# Patient Record
Sex: FEMALE | Race: WHITE | NOT HISPANIC OR LATINO | Employment: OTHER | URBAN - METROPOLITAN AREA
[De-identification: names, ages, dates, MRNs, and addresses within clinical notes are randomized per-mention and may not be internally consistent; named-entity substitution may affect disease eponyms.]

---

## 2021-01-08 ENCOUNTER — NEW PATIENT (OUTPATIENT)
Dept: URBAN - METROPOLITAN AREA CLINIC 36 | Facility: CLINIC | Age: 81
End: 2021-01-08

## 2021-01-08 DIAGNOSIS — H40.1132: ICD-10-CM

## 2021-01-08 PROCEDURE — 92250 FUNDUS PHOTOGRAPHY W/I&R: CPT

## 2021-01-08 PROCEDURE — 99204 OFFICE O/P NEW MOD 45 MIN: CPT

## 2021-01-08 PROCEDURE — 76514 ECHO EXAM OF EYE THICKNESS: CPT

## 2021-01-08 PROCEDURE — 92020 GONIOSCOPY: CPT

## 2021-01-08 ASSESSMENT — VISUAL ACUITY
OS_CC: 20/30-2
OD_CC: J2
OS_SC: J12
OD_BAT: 20/40 WITH MR
OS_BAT: 20/50 WITH MR
OD_SC: J10
OD_PH: 20/30
OS_SC: 20/30-2
OD_SC: 20/25
OS_CC: J3
OD_CC: 20/40

## 2021-01-08 ASSESSMENT — TONOMETRY
OD_IOP_MMHG: 16
OS_IOP_MMHG: 16

## 2021-01-08 ASSESSMENT — PACHYMETRY
OD_CT_UM: 556
OS_CT_UM: 566

## 2021-02-05 ENCOUNTER — IOP CHECK (OUTPATIENT)
Dept: URBAN - METROPOLITAN AREA CLINIC 36 | Facility: CLINIC | Age: 81
End: 2021-02-05

## 2021-02-05 DIAGNOSIS — Z96.1: ICD-10-CM

## 2021-02-05 DIAGNOSIS — H40.1132: ICD-10-CM

## 2021-02-05 DIAGNOSIS — H04.123: ICD-10-CM

## 2021-02-05 PROCEDURE — 92012 INTRM OPH EXAM EST PATIENT: CPT

## 2021-02-05 ASSESSMENT — TONOMETRY
OD_IOP_MMHG: 14
OS_IOP_MMHG: 14

## 2021-02-05 ASSESSMENT — VISUAL ACUITY
OD_CC: 20/30+1
OS_CC: 20/40+1

## 2021-12-03 ENCOUNTER — APPOINTMENT (RX ONLY)
Dept: URBAN - METROPOLITAN AREA CLINIC 151 | Facility: CLINIC | Age: 81
Setting detail: DERMATOLOGY
End: 2021-12-03

## 2021-12-03 DIAGNOSIS — Z71.89 OTHER SPECIFIED COUNSELING: ICD-10-CM

## 2021-12-03 DIAGNOSIS — L82.1 OTHER SEBORRHEIC KERATOSIS: ICD-10-CM

## 2021-12-03 DIAGNOSIS — Z80.8 FAMILY HISTORY OF MALIGNANT NEOPLASM OF OTHER ORGANS OR SYSTEMS: ICD-10-CM

## 2021-12-03 DIAGNOSIS — L81.5 LEUKODERMA, NOT ELSEWHERE CLASSIFIED: ICD-10-CM

## 2021-12-03 DIAGNOSIS — L81.4 OTHER MELANIN HYPERPIGMENTATION: ICD-10-CM

## 2021-12-03 PROBLEM — D23.39 OTHER BENIGN NEOPLASM OF SKIN OF OTHER PARTS OF FACE: Status: ACTIVE | Noted: 2021-12-03

## 2021-12-03 PROCEDURE — ? OTHER

## 2021-12-03 PROCEDURE — ? COUNSELING

## 2021-12-03 PROCEDURE — 99203 OFFICE O/P NEW LOW 30 MIN: CPT

## 2021-12-03 PROCEDURE — ? FULL BODY SKIN EXAM - DECLINED

## 2021-12-03 PROCEDURE — ? ADDITIONAL NOTES

## 2021-12-03 ASSESSMENT — LOCATION ZONE DERM
LOCATION ZONE: LEG
LOCATION ZONE: ARM
LOCATION ZONE: LIP
LOCATION ZONE: TRUNK
LOCATION ZONE: FACE

## 2021-12-03 ASSESSMENT — LOCATION SIMPLE DESCRIPTION DERM
LOCATION SIMPLE: CHEST
LOCATION SIMPLE: LEFT FOREARM
LOCATION SIMPLE: LEFT FOREHEAD
LOCATION SIMPLE: RIGHT LIP
LOCATION SIMPLE: LEFT POSTERIOR UPPER ARM
LOCATION SIMPLE: RIGHT POSTERIOR UPPER ARM
LOCATION SIMPLE: LEFT PRETIBIAL REGION
LOCATION SIMPLE: RIGHT FOREARM
LOCATION SIMPLE: LEFT CLAVICULAR SKIN

## 2021-12-03 ASSESSMENT — LOCATION DETAILED DESCRIPTION DERM
LOCATION DETAILED: RIGHT PROXIMAL DORSAL FOREARM
LOCATION DETAILED: LEFT CLAVICULAR SKIN
LOCATION DETAILED: LEFT INFERIOR FOREHEAD
LOCATION DETAILED: RIGHT DISTAL POSTERIOR UPPER ARM
LOCATION DETAILED: LEFT PROXIMAL POSTERIOR UPPER ARM
LOCATION DETAILED: LEFT PROXIMAL RADIAL DORSAL FOREARM
LOCATION DETAILED: RIGHT PHILTRAL RIDGE
LOCATION DETAILED: LEFT DISTAL PRETIBIAL REGION
LOCATION DETAILED: RIGHT LATERAL SUPERIOR CHEST
LOCATION DETAILED: RIGHT UPPER CUTANEOUS LIP

## 2021-12-03 NOTE — PROCEDURE: OTHER
Note Text (......Xxx Chief Complaint.): This diagnosis correlates with the
Detail Level: Zone
Render Risk Assessment In Note?: no
Other (Free Text): Grandmother.

## 2022-01-11 ENCOUNTER — CONSULTATION/EVALUATION (OUTPATIENT)
Dept: URBAN - METROPOLITAN AREA CLINIC 36 | Facility: CLINIC | Age: 82
End: 2022-01-11

## 2022-01-11 DIAGNOSIS — H35.363: ICD-10-CM

## 2022-01-11 DIAGNOSIS — H35.3221: ICD-10-CM

## 2022-01-11 DIAGNOSIS — H35.3112: ICD-10-CM

## 2022-01-11 DIAGNOSIS — H35.722: ICD-10-CM

## 2022-01-11 DIAGNOSIS — H43.392: ICD-10-CM

## 2022-01-11 DIAGNOSIS — D31.32: ICD-10-CM

## 2022-01-11 DIAGNOSIS — H35.731: ICD-10-CM

## 2022-01-11 PROCEDURE — 67028 INJECTION EYE DRUG: CPT

## 2022-01-11 PROCEDURE — 99214 OFFICE O/P EST MOD 30 MIN: CPT

## 2022-01-11 PROCEDURE — 92250 FUNDUS PHOTOGRAPHY W/I&R: CPT

## 2022-01-11 ASSESSMENT — VISUAL ACUITY
OD_CC: 20/30-2
OS_CC: 20/40-1

## 2022-01-11 ASSESSMENT — TONOMETRY
OS_IOP_MMHG: 15
OD_IOP_MMHG: 15